# Patient Record
Sex: MALE | Race: WHITE | NOT HISPANIC OR LATINO
[De-identification: names, ages, dates, MRNs, and addresses within clinical notes are randomized per-mention and may not be internally consistent; named-entity substitution may affect disease eponyms.]

---

## 2017-12-28 ENCOUNTER — APPOINTMENT (OUTPATIENT)
Dept: CARDIOLOGY | Facility: CLINIC | Age: 57
End: 2017-12-28
Payer: COMMERCIAL

## 2017-12-28 PROCEDURE — 93000 ELECTROCARDIOGRAM COMPLETE: CPT

## 2017-12-28 PROCEDURE — 99214 OFFICE O/P EST MOD 30 MIN: CPT

## 2018-05-14 ENCOUNTER — RECORD ABSTRACTING (OUTPATIENT)
Age: 58
End: 2018-05-14

## 2018-05-14 DIAGNOSIS — Z78.9 OTHER SPECIFIED HEALTH STATUS: ICD-10-CM

## 2018-05-14 RX ORDER — ESZOPICLONE 3 MG/1
3 TABLET, COATED ORAL
Refills: 0 | Status: ACTIVE | COMMUNITY

## 2018-05-14 RX ORDER — CHOLECALCIFEROL (VITAMIN D3) 25 MCG
TABLET ORAL
Refills: 0 | Status: ACTIVE | COMMUNITY

## 2018-05-17 ENCOUNTER — APPOINTMENT (OUTPATIENT)
Dept: CARDIOLOGY | Facility: CLINIC | Age: 58
End: 2018-05-17
Payer: COMMERCIAL

## 2018-05-17 VITALS
HEIGHT: 68 IN | BODY MASS INDEX: 25.76 KG/M2 | SYSTOLIC BLOOD PRESSURE: 138 MMHG | WEIGHT: 170 LBS | RESPIRATION RATE: 18 BRPM | HEART RATE: 61 BPM | DIASTOLIC BLOOD PRESSURE: 80 MMHG

## 2018-05-17 DIAGNOSIS — N28.1 CYST OF KIDNEY, ACQUIRED: ICD-10-CM

## 2018-05-17 DIAGNOSIS — M41.80 OTHER FORMS OF SCOLIOSIS, SITE UNSPECIFIED: ICD-10-CM

## 2018-05-17 DIAGNOSIS — Z87.898 PERSONAL HISTORY OF OTHER SPECIFIED CONDITIONS: ICD-10-CM

## 2018-05-17 DIAGNOSIS — Z86.39 PERSONAL HISTORY OF OTHER ENDOCRINE, NUTRITIONAL AND METABOLIC DISEASE: ICD-10-CM

## 2018-05-17 PROCEDURE — 99214 OFFICE O/P EST MOD 30 MIN: CPT

## 2018-05-17 PROCEDURE — 93000 ELECTROCARDIOGRAM COMPLETE: CPT

## 2018-08-10 ENCOUNTER — APPOINTMENT (OUTPATIENT)
Dept: CARDIOLOGY | Facility: CLINIC | Age: 58
End: 2018-08-10
Payer: COMMERCIAL

## 2018-08-10 ENCOUNTER — MEDICATION RENEWAL (OUTPATIENT)
Age: 58
End: 2018-08-10

## 2018-08-10 PROCEDURE — 93015 CV STRESS TEST SUPVJ I&R: CPT

## 2018-08-10 PROCEDURE — A9500: CPT

## 2018-08-10 PROCEDURE — 78452 HT MUSCLE IMAGE SPECT MULT: CPT

## 2018-08-10 RX ORDER — KIT FOR THE PREPARATION OF TECHNETIUM TC99M SESTAMIBI 1 MG/5ML
INJECTION, POWDER, LYOPHILIZED, FOR SOLUTION PARENTERAL
Refills: 0 | Status: COMPLETED | OUTPATIENT
Start: 2018-08-10

## 2018-08-10 RX ADMIN — KIT FOR THE PREPARATION OF TECHNETIUM TC99M SESTAMIBI 0: 1 INJECTION, POWDER, LYOPHILIZED, FOR SOLUTION PARENTERAL at 00:00

## 2018-09-27 ENCOUNTER — APPOINTMENT (OUTPATIENT)
Dept: CARDIOLOGY | Facility: CLINIC | Age: 58
End: 2018-09-27

## 2018-10-26 ENCOUNTER — APPOINTMENT (OUTPATIENT)
Dept: CARDIOLOGY | Facility: CLINIC | Age: 58
End: 2018-10-26
Payer: COMMERCIAL

## 2018-10-26 VITALS
RESPIRATION RATE: 15 BRPM | SYSTOLIC BLOOD PRESSURE: 117 MMHG | BODY MASS INDEX: 26.83 KG/M2 | HEART RATE: 44 BPM | DIASTOLIC BLOOD PRESSURE: 80 MMHG | WEIGHT: 177 LBS | HEIGHT: 68 IN

## 2018-10-26 PROCEDURE — 93000 ELECTROCARDIOGRAM COMPLETE: CPT

## 2018-10-26 PROCEDURE — 99214 OFFICE O/P EST MOD 30 MIN: CPT

## 2018-12-27 ENCOUNTER — OUTPATIENT (OUTPATIENT)
Dept: OUTPATIENT SERVICES | Facility: HOSPITAL | Age: 58
LOS: 1 days | End: 2018-12-27
Payer: COMMERCIAL

## 2018-12-27 DIAGNOSIS — G47.30 SLEEP APNEA, UNSPECIFIED: ICD-10-CM

## 2018-12-27 PROCEDURE — 95810 POLYSOM 6/> YRS 4/> PARAM: CPT | Mod: 26

## 2018-12-27 PROCEDURE — 95810 POLYSOM 6/> YRS 4/> PARAM: CPT

## 2018-12-31 ENCOUNTER — RX RENEWAL (OUTPATIENT)
Age: 58
End: 2018-12-31

## 2019-01-21 ENCOUNTER — TRANSCRIPTION ENCOUNTER (OUTPATIENT)
Age: 59
End: 2019-01-21

## 2019-01-28 ENCOUNTER — NON-APPOINTMENT (OUTPATIENT)
Age: 59
End: 2019-01-28

## 2019-01-28 ENCOUNTER — APPOINTMENT (OUTPATIENT)
Dept: CARDIOLOGY | Facility: CLINIC | Age: 59
End: 2019-01-28
Payer: COMMERCIAL

## 2019-01-28 VITALS
SYSTOLIC BLOOD PRESSURE: 110 MMHG | HEIGHT: 68 IN | WEIGHT: 175 LBS | RESPIRATION RATE: 16 BRPM | DIASTOLIC BLOOD PRESSURE: 65 MMHG | BODY MASS INDEX: 26.52 KG/M2

## 2019-01-28 PROCEDURE — 99214 OFFICE O/P EST MOD 30 MIN: CPT

## 2019-01-28 PROCEDURE — 93000 ELECTROCARDIOGRAM COMPLETE: CPT

## 2019-01-28 RX ORDER — ATORVASTATIN CALCIUM 20 MG/1
20 TABLET, FILM COATED ORAL DAILY
Qty: 90 | Refills: 3 | Status: DISCONTINUED | COMMUNITY
Start: 2018-05-17 | End: 2019-01-28

## 2019-01-28 NOTE — HISTORY OF PRESENT ILLNESS
[FreeTextEntry1] : \par The patient's history is primarily that of:\par 1.	Hypertension.\par 2.	Hyperlipidemia.\par 3.	A dilated ascending thoracic aorta. 4 cm\par 4.             Asymptomatic coronary atherosclerosis by EBCT\par \par He's in middle of sleep study testing.\par There are no new intercurrent medical issues.  He is sometimes working double shifts.\par Patient also has concerns about management of his hyperlipidemia

## 2019-01-28 NOTE — ASSESSMENT
[FreeTextEntry1] : ECG  sinus anderson at 57. Borderline LVH. No significant ST or T wave changes.\par \par \par \par Exercise stress test 8/10/18:\par 11 minutes of Shivam protocol (12 Mets).\par Heart rate 149\par Blood pressure response normal\par ECG in assisted images negative for ischemia.\par \par Sleep study 2/27/18:\par AHI equals 16\par Lowest O2 sat 86%\par \par Laboratory data \par                     12/28/18:   9/15/18\par Cholesterol    146             168\par HDL                 40                40\par LDL                  88              111\par AST                 53                31\par ALT                  65                40\par \par EBCT 3/34/18 \par 3 circumflex lesions \par four RCA lesions 3 LAD lesions total calcium score 250 increase from 100, 6 years ago\par \par Impression:\par \par 1. Hypertension seems to be adequately controlled with the new medical regimen.\par \par 2. Atherosclerotic disease of the coronary seems to be progressing based on the above data.\par    But exercise sestamibi stress test shows excellent tolerance and no ischemia\par \par 3. Atorvastatin 20 mg somewhat more effective ( see above). Bump in transaminases is noted\par    Prior use of Crestor reportedly had caused some LFT abnormalities. Do not have the data or documentation of this.\par \par Recommendations include:\par \par 1. Cont atorvastatin 20 mg a day  recheck labs in 2 months\par  \par 2. Improvement in dietary habits.\par \par 3. Complete CPAP trial and f/u with Pulmonary\par \par 4. Office followup here in 4 months.

## 2019-01-28 NOTE — REASON FOR VISIT
[FreeTextEntry1] : Patient is seen here today in followup:\par \par calcium scoring done through [facility] examination on March 30, 2018. was increased  compared to prior test done August 7, 2012.\par Based on that and his lipid values we had increased his atorvastatin to 20 mg day never used

## 2019-02-11 ENCOUNTER — OUTPATIENT (OUTPATIENT)
Dept: OUTPATIENT SERVICES | Facility: HOSPITAL | Age: 59
LOS: 1 days | End: 2019-02-11
Payer: COMMERCIAL

## 2019-02-11 DIAGNOSIS — G47.33 OBSTRUCTIVE SLEEP APNEA (ADULT) (PEDIATRIC): ICD-10-CM

## 2019-02-11 PROCEDURE — 95811 POLYSOM 6/>YRS CPAP 4/> PARM: CPT | Mod: 26

## 2019-02-11 PROCEDURE — 95811 POLYSOM 6/>YRS CPAP 4/> PARM: CPT

## 2019-02-18 ENCOUNTER — APPOINTMENT (OUTPATIENT)
Dept: CARDIOLOGY | Facility: CLINIC | Age: 59
End: 2019-02-18
Payer: COMMERCIAL

## 2019-02-18 PROCEDURE — 93880 EXTRACRANIAL BILAT STUDY: CPT

## 2019-02-28 ENCOUNTER — APPOINTMENT (OUTPATIENT)
Dept: PULMONOLOGY | Facility: CLINIC | Age: 59
End: 2019-02-28
Payer: COMMERCIAL

## 2019-02-28 VITALS
DIASTOLIC BLOOD PRESSURE: 80 MMHG | WEIGHT: 180 LBS | OXYGEN SATURATION: 95 % | BODY MASS INDEX: 28.93 KG/M2 | SYSTOLIC BLOOD PRESSURE: 118 MMHG | HEART RATE: 56 BPM | HEIGHT: 66 IN

## 2019-02-28 DIAGNOSIS — Z87.19 PERSONAL HISTORY OF OTHER DISEASES OF THE DIGESTIVE SYSTEM: ICD-10-CM

## 2019-02-28 DIAGNOSIS — Z87.39 PERSONAL HISTORY OF OTHER DISEASES OF THE MUSCULOSKELETAL SYSTEM AND CONNECTIVE TISSUE: ICD-10-CM

## 2019-02-28 DIAGNOSIS — Z86.39 PERSONAL HISTORY OF OTHER ENDOCRINE, NUTRITIONAL AND METABOLIC DISEASE: ICD-10-CM

## 2019-02-28 PROCEDURE — 99204 OFFICE O/P NEW MOD 45 MIN: CPT

## 2019-02-28 NOTE — CONSULT LETTER
[Dear  ___] : Dear  [unfilled], [Consult Letter:] : I had the pleasure of evaluating your patient, [unfilled]. [Consult Closing:] : Thank you very much for allowing me to participate in the care of this patient.  If you have any questions, please do not hesitate to contact me. [DrJesus  ___] : Dr. MILLER

## 2019-02-28 NOTE — REVIEW OF SYSTEMS
[Difficulty Initiating Sleep] : no difficulty falling asleep [Difficulty Maintaining Sleep] : difficulty maintaining sleep [Lower Extremity Discomfort] : no lower extremity discomfort [Unusual Sleep Behavior] : no unusual sleep behavior [Hypersomnolence] : sleeping much more than usual [Negative] : Psychiatric [FreeTextEntry3] : per HPI [FreeTextEntry8] : per HPI [FreeTextEntry9] : per HPI

## 2019-02-28 NOTE — PHYSICAL EXAM
[General Appearance - In No Acute Distress] : no acute distress [Normal Conjunctiva] : the conjunctiva exhibited no abnormalities [Normal Oropharynx] : abnormal oropharynx [Low Lying Soft Palate] : low lying soft palate [Elongated Uvula] : elongated uvula [III] : III [Neck Appearance] : the appearance of the neck was normal [Heart Rate And Rhythm] : heart rate was normal and rhythm regular [Heart Sounds] : normal S1 and S2 [] : no respiratory distress [Respiration, Rhythm And Depth] : normal respiratory rhythm and effort [Exaggerated Use Of Accessory Muscles For Inspiration] : no accessory muscle use [Auscultation Breath Sounds / Voice Sounds] : lungs were clear to auscultation bilaterally [Abnormal Walk] : normal gait [Musculoskeletal - Swelling] : no joint swelling seen [Nail Clubbing] : no clubbing of the fingernails [Cyanosis, Localized] : no localized cyanosis [No Focal Deficits] : no focal deficits [Oriented To Time, Place, And Person] : oriented to person, place, and time [Impaired Insight] : insight and judgment were intact [Affect] : the affect was normal

## 2019-02-28 NOTE — HISTORY OF PRESENT ILLNESS
[AHI: ___ per hour] : Apnea-hypopnea index:  [unfilled] per hour [Date: ___] : the most recent therapeutic polysomnogram was completed [unfilled] [Snoring] : snoring [Witnessed Apneas] : witnessed sleep apnea [Frequent Nocturnal Awakening] : frequent nocturnal awakening [Unintentional Sleep While Inactive] : unintentional sleep while inactive [Awakes Unrefreshed] : awakening unrefreshed [Obstructive Sleep Apnea] : obstructive sleep apnea [FreeTextEntry1] : CPAP was not effective in resolving SAIMA.\par \par Hx anxiety.  Insomnia. Sleep also ruined by significant back pain. Takes lunesta.

## 2019-03-01 ENCOUNTER — TRANSCRIPTION ENCOUNTER (OUTPATIENT)
Age: 59
End: 2019-03-01

## 2019-03-12 ENCOUNTER — OUTPATIENT (OUTPATIENT)
Dept: OUTPATIENT SERVICES | Facility: HOSPITAL | Age: 59
LOS: 1 days | End: 2019-03-12
Payer: COMMERCIAL

## 2019-03-12 DIAGNOSIS — G47.33 OBSTRUCTIVE SLEEP APNEA (ADULT) (PEDIATRIC): ICD-10-CM

## 2019-03-12 PROCEDURE — 95811 POLYSOM 6/>YRS CPAP 4/> PARM: CPT

## 2019-03-12 PROCEDURE — 95811 POLYSOM 6/>YRS CPAP 4/> PARM: CPT | Mod: 26

## 2019-03-13 ENCOUNTER — LABORATORY RESULT (OUTPATIENT)
Age: 59
End: 2019-03-13

## 2019-03-18 ENCOUNTER — RECORD ABSTRACTING (OUTPATIENT)
Age: 59
End: 2019-03-18

## 2019-03-22 ENCOUNTER — CLINICAL ADVICE (OUTPATIENT)
Age: 59
End: 2019-03-22

## 2019-03-28 ENCOUNTER — APPOINTMENT (OUTPATIENT)
Dept: CARDIOLOGY | Facility: CLINIC | Age: 59
End: 2019-03-28
Payer: COMMERCIAL

## 2019-03-28 ENCOUNTER — APPOINTMENT (OUTPATIENT)
Dept: PULMONOLOGY | Facility: CLINIC | Age: 59
End: 2019-03-28
Payer: COMMERCIAL

## 2019-03-28 VITALS
HEIGHT: 66 IN | WEIGHT: 175 LBS | SYSTOLIC BLOOD PRESSURE: 120 MMHG | BODY MASS INDEX: 28.12 KG/M2 | DIASTOLIC BLOOD PRESSURE: 80 MMHG | RESPIRATION RATE: 16 BRPM

## 2019-03-28 VITALS
OXYGEN SATURATION: 94 % | HEIGHT: 66 IN | HEART RATE: 54 BPM | BODY MASS INDEX: 29.09 KG/M2 | DIASTOLIC BLOOD PRESSURE: 72 MMHG | WEIGHT: 181 LBS | SYSTOLIC BLOOD PRESSURE: 112 MMHG

## 2019-03-28 PROCEDURE — 99214 OFFICE O/P EST MOD 30 MIN: CPT

## 2019-03-28 PROCEDURE — 99214 OFFICE O/P EST MOD 30 MIN: CPT | Mod: 25

## 2019-03-28 PROCEDURE — 94010 BREATHING CAPACITY TEST: CPT

## 2019-03-28 PROCEDURE — 93000 ELECTROCARDIOGRAM COMPLETE: CPT

## 2019-03-28 NOTE — ASSESSMENT
[FreeTextEntry1] : ECG  sinus anderson at 57. Borderline LVH. No significant ST or T wave changes.\par \par Laboratory data \par                     12/28/18:   9/15/18\par Cholesterol    146             168\par HDL                 40                40\par LDL                  88              111\par AST                 53                31\par ALT                  65                40\par \par Laboratory data a 3/14/19:\par Cholesterol 110\par HDL 35\par LDL 65\par ALT 46  AST . 34\par \par Compared to prior study ALT was 65 and AST was 53 so both are improved.\par \par Carotid study 2/18/19 mild plaquing but no significant stenosis seen.\par \par Exercise stress test 8/10/18:\par 11 minutes of Shivam protocol (12 Mets).\par Heart rate 149\par Blood pressure response normal\par ECG in assisted images negative for ischemia.\par \par Sleep study 2/27/18:\par AHI equals 16\par Lowest O2 sat 86%\par \par \par EBCT 3/34/18 \par 3 circumflex lesions \par four RCA lesions 3 LAD lesions total calcium score 250 increase from 100, 6 years ago\par \par Impression:\par \par 1. Hypertension seems to be adequately controlled with the new medical regimen.\par \par 2. Atherosclerotic disease of the coronary seems to be progressing based on the above data.\par    But exercise sestamibi stress test shows excellent tolerance and no ischemia\par \par 3. Atorvastatin 20 mg somewhat more effective ( see above). Bump in transaminases better\par    Prior use of Crestor reportedly had caused some LFT abnormalities. Do not have the data or documentation of this.\par \par Recommendations include:\par \par 1. Cont atorvastatin 20 mg a day  recheck labs in 2 months\par  \par 2. Improvement in dietary habits.\par \par 3. Rx of SAIMA\par \par 4. Proceed with f/u EGD\par \par 4. Office followup here in 4 months.

## 2019-03-28 NOTE — REASON FOR VISIT
[FreeTextEntry1] : Patient is seen here today in followup:\par \par calcium scoring done through [facility] examination on March 30, 2018. was increased  compared to prior test done August 7, 2012.\par Based on that and his lipid values we had increased his atorvastatin to 20 mg day\par \par Was found to have stomach ulcers in January. Treatment was started.\par Aspirin was stopped.\par \par Sleep study abnormal with an AHI of 16.\par CPAP was not effective.\par BiPAP is being initiated.\par \par A repeat upper endoscopy is planned to recheck healing of the stomach ulcers.\par Blood work recently obtained to reassess elevated LFTs.

## 2019-06-27 ENCOUNTER — APPOINTMENT (OUTPATIENT)
Dept: PULMONOLOGY | Facility: CLINIC | Age: 59
End: 2019-06-27
Payer: COMMERCIAL

## 2019-06-27 ENCOUNTER — LABORATORY RESULT (OUTPATIENT)
Age: 59
End: 2019-06-27

## 2019-06-27 VITALS
BODY MASS INDEX: 28.57 KG/M2 | OXYGEN SATURATION: 96 % | SYSTOLIC BLOOD PRESSURE: 110 MMHG | DIASTOLIC BLOOD PRESSURE: 60 MMHG | WEIGHT: 177 LBS | HEART RATE: 58 BPM

## 2019-06-27 PROCEDURE — 99214 OFFICE O/P EST MOD 30 MIN: CPT

## 2019-07-29 ENCOUNTER — APPOINTMENT (OUTPATIENT)
Dept: CARDIOLOGY | Facility: CLINIC | Age: 59
End: 2019-07-29

## 2019-10-21 ENCOUNTER — LABORATORY RESULT (OUTPATIENT)
Age: 59
End: 2019-10-21

## 2019-10-22 ENCOUNTER — RECORD ABSTRACTING (OUTPATIENT)
Age: 59
End: 2019-10-22

## 2019-10-23 ENCOUNTER — RX RENEWAL (OUTPATIENT)
Age: 59
End: 2019-10-23

## 2019-11-05 ENCOUNTER — NON-APPOINTMENT (OUTPATIENT)
Age: 59
End: 2019-11-05

## 2019-11-05 ENCOUNTER — RECORD ABSTRACTING (OUTPATIENT)
Age: 59
End: 2019-11-05

## 2019-11-05 ENCOUNTER — APPOINTMENT (OUTPATIENT)
Dept: CARDIOLOGY | Facility: CLINIC | Age: 59
End: 2019-11-05
Payer: COMMERCIAL

## 2019-11-05 VITALS
HEIGHT: 66 IN | SYSTOLIC BLOOD PRESSURE: 120 MMHG | DIASTOLIC BLOOD PRESSURE: 80 MMHG | RESPIRATION RATE: 16 BRPM | BODY MASS INDEX: 29.25 KG/M2 | HEART RATE: 57 BPM | WEIGHT: 182 LBS | OXYGEN SATURATION: 98 %

## 2019-11-05 PROCEDURE — 99214 OFFICE O/P EST MOD 30 MIN: CPT

## 2019-11-05 PROCEDURE — 93000 ELECTROCARDIOGRAM COMPLETE: CPT

## 2019-11-05 NOTE — REASON FOR VISIT
[FreeTextEntry1] : 59 year old male presenting for cardiac revaluation. \par The patient's history is primarily that of:\par 1.	Hypertension.\par 2.	Hyperlipidemia.\par 3.	A dilated ascending thoracic aorta. 4 cm\par 4.             Asymptomatic coronary atherosclerosis by EBCT\par \par \par \par Continues to follow up with Dr. Cabral/ [facility] for sleep apnea. Recently received a  new mask and tolerating. Sleep study abnormal with an AHI of 16.\par \par Aspirin was stopped due to history of stomach ulcers\par \par Does not exercise, works a lot of overtime.\par \par Blood work collected on 10/21/19 review\par \par Denies any chest pain, sob, palps or edema

## 2019-11-05 NOTE — HISTORY OF PRESENT ILLNESS
[FreeTextEntry1] : calcium scoring done through [facility] examination on March 30, 2018. was increased  compared to prior test done August 7, 2012.Based on that and his lipid values we had increased his atorvastatin to 20 mg day\par

## 2019-11-05 NOTE — ASSESSMENT
[FreeTextEntry1] : ECG  sinus anderson at 57. Low voltage for  LVH,. No significant ST or T wave changes.\par \par Laboratory data \par Lab data 10/21/19\par Chol.  116\par LDL    65\par HDL   41\par LFTS WNL\par \par Carotid study 2/18/19 mild plaquing but no significant stenosis seen.\par \par Exercise stress test 8/10/18:\par 11 minutes of Shivam protocol (12 Mets).\par Heart rate 149\par Blood pressure response normal\par ECG in assisted images negative for ischemia.\par \par Sleep study 2/27/18:\par AHI  = 16\par Lowest O2 sat 86%\par \par EBCT 3/34/18 \par 3 circumflex lesions \par four RCA lesions 3 LAD lesions total calcium score 250 increase from 100, 6 years ago\par \par Impression:\par \par 1. Hypertension controlled on current regimen. Today 120/80.\par \par 2. Atherosclerotic disease of the coronary seems to be progressing based on the above data.\par    But exercise sestamibi stress test shows excellent tolerance and no ischemia\par \par 3. Lipid panel with good control  and tolerating  Atorvastatin 20 mg . LFTS WNL\par \par 4. Up 5 lbs since June, BMI 29.\par \par 5. 2017 echo stable with normal LV function.\par \par \par Recommendations include:\par \par 1. Cont atorvastatin 20 mg a day\par  \par 2. Improvement in dietary habits, exercise has been encouraged.\par \par 3. Continue Bipap and pulm f/u.\par \par 4. Repeat echo prior to follow up.\par \par Clinical follow up in 6 months \par \par \par

## 2019-12-27 ENCOUNTER — APPOINTMENT (OUTPATIENT)
Dept: PULMONOLOGY | Facility: CLINIC | Age: 59
End: 2019-12-27

## 2020-03-23 ENCOUNTER — LABORATORY RESULT (OUTPATIENT)
Age: 60
End: 2020-03-23

## 2020-03-24 LAB
ANION GAP SERPL CALC-SCNC: 11 MMOL/L
BUN SERPL-MCNC: 13 MG/DL
CALCIUM SERPL-MCNC: 9.5 MG/DL
CHLORIDE SERPL-SCNC: 106 MMOL/L
CO2 SERPL-SCNC: 26 MMOL/L
CREAT SERPL-MCNC: 0.87 MG/DL
GLUCOSE SERPL-MCNC: 96 MG/DL
POTASSIUM SERPL-SCNC: 4.4 MMOL/L
SODIUM SERPL-SCNC: 143 MMOL/L

## 2020-04-01 LAB
CHOLEST SERPL-MCNC: 103 MG/DL
CHOLEST/HDLC SERPL: 2.8 RATIO
HDLC SERPL-MCNC: 37 MG/DL
LDLC SERPL CALC-MCNC: 57 MG/DL
TRIGL SERPL-MCNC: 49 MG/DL

## 2020-04-13 ENCOUNTER — APPOINTMENT (OUTPATIENT)
Dept: CARDIOLOGY | Facility: CLINIC | Age: 60
End: 2020-04-13

## 2020-04-20 ENCOUNTER — APPOINTMENT (OUTPATIENT)
Dept: CARDIOLOGY | Facility: CLINIC | Age: 60
End: 2020-04-20

## 2020-06-29 ENCOUNTER — APPOINTMENT (OUTPATIENT)
Dept: CARDIOLOGY | Facility: CLINIC | Age: 60
End: 2020-06-29

## 2020-08-17 ENCOUNTER — APPOINTMENT (OUTPATIENT)
Dept: CARDIOLOGY | Facility: CLINIC | Age: 60
End: 2020-08-17
Payer: COMMERCIAL

## 2020-08-17 PROCEDURE — 93306 TTE W/DOPPLER COMPLETE: CPT

## 2020-09-09 LAB
ALBUMIN SERPL ELPH-MCNC: 4.2 G/DL
ALP BLD-CCNC: 89 U/L
ALT SERPL-CCNC: 42 U/L
ANION GAP SERPL CALC-SCNC: 10 MMOL/L
AST SERPL-CCNC: 36 U/L
BILIRUB SERPL-MCNC: 1 MG/DL
BUN SERPL-MCNC: 18 MG/DL
CALCIUM SERPL-MCNC: 9.9 MG/DL
CHLORIDE SERPL-SCNC: 104 MMOL/L
CHOLEST SERPL-MCNC: 107 MG/DL
CHOLEST/HDLC SERPL: 3 RATIO
CO2 SERPL-SCNC: 26 MMOL/L
CREAT SERPL-MCNC: 0.69 MG/DL
GLUCOSE SERPL-MCNC: 126 MG/DL
HDLC SERPL-MCNC: 36 MG/DL
LDLC SERPL CALC-MCNC: 64 MG/DL
POTASSIUM SERPL-SCNC: 3.8 MMOL/L
PROT SERPL-MCNC: 6.4 G/DL
SODIUM SERPL-SCNC: 140 MMOL/L
TRIGL SERPL-MCNC: 39 MG/DL

## 2020-09-14 ENCOUNTER — APPOINTMENT (OUTPATIENT)
Dept: CARDIOLOGY | Facility: CLINIC | Age: 60
End: 2020-09-14
Payer: COMMERCIAL

## 2020-09-14 ENCOUNTER — NON-APPOINTMENT (OUTPATIENT)
Age: 60
End: 2020-09-14

## 2020-09-14 VITALS
RESPIRATION RATE: 16 BRPM | DIASTOLIC BLOOD PRESSURE: 72 MMHG | BODY MASS INDEX: 26.37 KG/M2 | HEART RATE: 51 BPM | OXYGEN SATURATION: 98 % | TEMPERATURE: 97.3 F | HEIGHT: 68 IN | WEIGHT: 174 LBS | SYSTOLIC BLOOD PRESSURE: 122 MMHG

## 2020-09-14 PROCEDURE — 93000 ELECTROCARDIOGRAM COMPLETE: CPT

## 2020-09-14 PROCEDURE — 99214 OFFICE O/P EST MOD 30 MIN: CPT

## 2020-09-14 RX ORDER — FAMOTIDINE 20 MG/1
20 TABLET, FILM COATED ORAL
Qty: 180 | Refills: 0 | Status: ACTIVE | COMMUNITY
Start: 2020-09-14

## 2020-09-14 RX ORDER — ESCITALOPRAM OXALATE 5 MG/1
5 TABLET ORAL DAILY
Refills: 0 | Status: DISCONTINUED | COMMUNITY
End: 2020-09-14

## 2020-09-14 RX ORDER — OXYCODONE AND ACETAMINOPHEN 5; 325 MG/1; MG/1
5-325 TABLET ORAL
Qty: 120 | Refills: 0 | Status: ACTIVE | COMMUNITY
Start: 2020-05-06

## 2020-09-14 RX ORDER — ESCITALOPRAM OXALATE 10 MG/1
10 TABLET ORAL
Qty: 90 | Refills: 0 | Status: ACTIVE | COMMUNITY
Start: 2020-09-09

## 2020-09-14 NOTE — ASSESSMENT
[FreeTextEntry1] : ECG  sinus anderson at 51. voltage for  LVH,. No significant ST or T wave changes.\par \par Lab Data \par        20  On atorvastatin 10 mg\par Chol: 107\par HDL:   36\par LDL:   64\par Tr\par Creat: 0.69\par \par Laboratory data \par Lab data 10/21/19\par Chol.  116\par LDL    65\par HDL   41\par LFTS WNL\par \par \par Echocardiogram 20:\par Normal LV size and function ejection fraction 60-65%\par Pulmonary pressures elevated at 40 mmHg\par Left atrial enlargement\par Ascending aorta 4 cm unchanged in comparison to a study from 2017\par Carotid study 19 mild plaquing but no significant stenosis seen.\par \par Exercise stress test 8/10/18:\par 11 minutes of Shivam protocol (12 Mets).\par Heart rate 149\par Blood pressure response normal\par ECG in assisted images negative for ischemia.\par \par Sleep study 18:\par AHI  = 16\par Lowest O2 sat 86%\par \par EBCT 3/34/18 \par 3 circumflex lesions \par four RCA lesions 3 LAD lesions total calcium score 250 increase from 100, 6 years ago\par \par Impression:\par \par 1. Hypertension controlled on current regimen. \par \par 2. Atherosclerotic disease of the coronary seems to be progressing based on the above data.\par    But exercise sestamibi stress test shows excellent tolerance and no ischemia\par \par 3. Lipid panel with good control  and tolerating  Atorvastatin 10 mg . LFTS now  WNL\par \par 4. Down 8 lbs since , BMI 29.\par \par 5. repeat  echo stable with normal LV function and no change in the dimensions of the ascending aorta. 4.0 cm\par \par 6. SAIMA which is being treated with no increase in the PAP.\par \par Recommendations include:\par \par 1. Cont atorvastatin 10 mg a day with labs in 6 months\par  \par 2. Improvement in dietary habits, exercise has been encouraged.\par \par 3. Continue Bipap and pulm f/u.\par \par 4. Repeat echo 1 year\par \par Clinical follow up in 6 months \par \par \par

## 2020-09-14 NOTE — REASON FOR VISIT
[FreeTextEntry1] : 60  year old male presenting for cardiac revaluation. \par The patient's history is primarily that of:\par 1.	Hypertension.\par 2.	Hyperlipidemia.\par 3.	A dilated ascending thoracic aorta. 4 cm\par 4.             Asymptomatic coronary atherosclerosis by EBCT\par \par \par Continues to follow up with Dr. Cabral/ [facility] for sleep apnea. Recently received a  new mask and tolerating. Sleep study abnormal with an AHI of 16.\par \par Aspirin was stopped due to history of stomach ulcers\par \par Does not exercise, works a lot of overtime.\par \par Blood work collected will be reviewed\par \par Denies any chest pain, sob, palps or edema

## 2020-09-14 NOTE — HISTORY OF PRESENT ILLNESS
[FreeTextEntry1] : calcium scoring done through [facility] examination on March 30, 2018. was increased  compared to prior test done August 7, 2012.Based on that and his lipid values we had increased his atorvastatin to 20 mg day.\par \par Subsequently, the patient developed some mild LFT abnormalities and we reduced his atorvastatin to 10 mg a day.\par

## 2020-09-14 NOTE — REVIEW OF SYSTEMS
[Negative] : Heme/Lymph [Recent Weight Gain (___ Lbs)] : no recent weight gain [Recent Weight Loss (___ Lbs)] : recent [unfilled] ~Ulb weight loss

## 2020-12-14 ENCOUNTER — RX RENEWAL (OUTPATIENT)
Age: 60
End: 2020-12-14

## 2020-12-16 ENCOUNTER — RX RENEWAL (OUTPATIENT)
Age: 60
End: 2020-12-16

## 2020-12-28 ENCOUNTER — NON-APPOINTMENT (OUTPATIENT)
Age: 60
End: 2020-12-28

## 2021-02-23 ENCOUNTER — APPOINTMENT (OUTPATIENT)
Dept: PULMONOLOGY | Facility: CLINIC | Age: 61
End: 2021-02-23
Payer: COMMERCIAL

## 2021-02-23 VITALS
TEMPERATURE: 98 F | OXYGEN SATURATION: 97 % | DIASTOLIC BLOOD PRESSURE: 59 MMHG | BODY MASS INDEX: 27.98 KG/M2 | HEART RATE: 56 BPM | WEIGHT: 184 LBS | SYSTOLIC BLOOD PRESSURE: 120 MMHG

## 2021-02-23 DIAGNOSIS — F41.9 ANXIETY DISORDER, UNSPECIFIED: ICD-10-CM

## 2021-02-23 PROCEDURE — 99214 OFFICE O/P EST MOD 30 MIN: CPT

## 2021-02-23 PROCEDURE — 99072 ADDL SUPL MATRL&STAF TM PHE: CPT

## 2021-02-23 RX ORDER — KETOCONAZOLE 20.5 MG/ML
2 SHAMPOO, SUSPENSION TOPICAL
Qty: 120 | Refills: 0 | Status: DISCONTINUED | COMMUNITY
Start: 2020-02-13 | End: 2021-02-23

## 2021-02-23 RX ORDER — RANITIDINE 150 MG/1
150 TABLET ORAL TWICE DAILY
Refills: 0 | Status: DISCONTINUED | COMMUNITY
End: 2021-02-23

## 2021-02-23 RX ORDER — LEVOTHYROXINE SODIUM 25 UG/1
25 TABLET ORAL DAILY
Refills: 0 | Status: DISCONTINUED | COMMUNITY
End: 2021-02-23

## 2021-02-23 RX ORDER — PANTOPRAZOLE 40 MG/1
40 TABLET, DELAYED RELEASE ORAL DAILY
Refills: 0 | Status: DISCONTINUED | COMMUNITY
Start: 2019-02-28 | End: 2021-02-23

## 2021-03-02 LAB
ALBUMIN SERPL ELPH-MCNC: 4.6 G/DL
ALP BLD-CCNC: 86 U/L
ALT SERPL-CCNC: 40 U/L
ANION GAP SERPL CALC-SCNC: 13 MMOL/L
AST SERPL-CCNC: 35 U/L
BILIRUB SERPL-MCNC: 0.3 MG/DL
BUN SERPL-MCNC: 16 MG/DL
CALCIUM SERPL-MCNC: 9.6 MG/DL
CHLORIDE SERPL-SCNC: 104 MMOL/L
CHOLEST SERPL-MCNC: 126 MG/DL
CO2 SERPL-SCNC: 24 MMOL/L
CREAT SERPL-MCNC: 0.92 MG/DL
GLUCOSE SERPL-MCNC: 96 MG/DL
HDLC SERPL-MCNC: 42 MG/DL
LDLC SERPL CALC-MCNC: 75 MG/DL
NONHDLC SERPL-MCNC: 85 MG/DL
POTASSIUM SERPL-SCNC: 4.4 MMOL/L
PROT SERPL-MCNC: 7.2 G/DL
SODIUM SERPL-SCNC: 141 MMOL/L
TRIGL SERPL-MCNC: 47 MG/DL

## 2021-03-15 ENCOUNTER — APPOINTMENT (OUTPATIENT)
Dept: CARDIOLOGY | Facility: CLINIC | Age: 61
End: 2021-03-15
Payer: COMMERCIAL

## 2021-03-15 VITALS
BODY MASS INDEX: 26.98 KG/M2 | WEIGHT: 178 LBS | HEART RATE: 55 BPM | HEIGHT: 68 IN | TEMPERATURE: 97.8 F | RESPIRATION RATE: 16 BRPM | SYSTOLIC BLOOD PRESSURE: 118 MMHG | DIASTOLIC BLOOD PRESSURE: 75 MMHG | OXYGEN SATURATION: 98 %

## 2021-03-15 DIAGNOSIS — I07.1 RHEUMATIC TRICUSPID INSUFFICIENCY: ICD-10-CM

## 2021-03-15 PROCEDURE — 99214 OFFICE O/P EST MOD 30 MIN: CPT

## 2021-03-15 PROCEDURE — 93000 ELECTROCARDIOGRAM COMPLETE: CPT

## 2021-03-15 PROCEDURE — 99072 ADDL SUPL MATRL&STAF TM PHE: CPT

## 2021-03-15 NOTE — ASSESSMENT
[FreeTextEntry1] : ECG  sinus anderson at 55 bpm and WNL\par \par \par Lab data 3/2/21\par Chol. 126\par HDL 42\par LDL 75\par Tri. 47\par Creat. 0.92\par AST 35\par ALT 40\par \par Lab Data \par        20  On atorvastatin 10 mg\par Chol: 107\par HDL:   36\par LDL:   64\par Tr\par Creat: 0.69\par \par \par \par \par Echocardiogram 20:\par Normal LV size and function ejection fraction 60-65%\par Pulmonary pressures elevated at 40 mmHg\par Left atrial enlargement\par Ascending aorta 4 cm unchanged in comparison to a study from 2017\par Carotid study 19 mild plaquing but no significant stenosis seen.\par \par Exercise stress test 8/10/18:\par 11 minutes of Shivam protocol (12 Mets).\par Heart rate 149\par Blood pressure response normal\par ECG in assisted images negative for ischemia.\par \par Sleep study 18:\par AHI  = 16\par Lowest O2 sat 86%\par \par EBCT 3/34/18 \par 3 circumflex lesions \par four RCA lesions 3 LAD lesions total calcium score 250 increase from 100, 6 years ago\par \par Impression:\par \par 1. Hypertension controlled on current regimen.  ECG unremarkable. \par \par 2. Atherosclerotic disease of the coronary seems to be progressing based on the above data.\par    But exercise sestamibi stress test shows excellent tolerance and no ischemia\par \par 3. Lipid panel with good control  .LFTS tolerating lower dose of Atorvastatin 10 mg , although having mild b/l leg cramping. \par \par 4. Down additional  6 lbs since 21 but up 4 lbs when compared to Sept. OV.\par \par 5. Repeat  echo stable with normal LV function and no change in the dimensions of the ascending aorta. 4.0 cm\par \par 6. SAIMA which is being treated with no increase in the PAP.\par \par Recommendations include:\par \par 1. Cont atorvastatin 10 mg a day with labs in 6 months. Suggested Co-Q 10 for cramping. \par  \par 2. Improvement in dietary habits, exercise has been encouraged.\par \par 3. Continue Bipap and pulm f/u.\par \par 4. Repeat echo in the summer of \par \par Clinical follow up in 6 months \par \par \par

## 2021-03-15 NOTE — HISTORY OF PRESENT ILLNESS
[FreeTextEntry1] : calcium scoring done through [facility] examination on March 30, 2018. was increased  compared to prior test done August 7, 2012.Based on that and his lipid values we had increased his atorvastatin to 20 mg day.\par \par Subsequently, the patient developed some mild LFT abnormalities and we reduced his atorvastatin to 10 mg a day.\par Recent BW from 3/2/21 = AST 35 ALT 40, was AST 42 and ALT  53 in March of 2020\par

## 2021-03-15 NOTE — REASON FOR VISIT
[FreeTextEntry1] : 60  year old male presenting for cardiac revaluation. \par The patient's history is primarily that of:\par 1.	Hypertension.\par 2.	Hyperlipidemia.\par 3.	A dilated ascending thoracic aorta. 4 cm\par 4.             Asymptomatic coronary atherosclerosis by EBCT\par \par \par Continues to follow up with Dr. Cabral/ [facility] for sleep apnea. Has been very compliant with his BIPAP using it approx 97% with > 4 hrs. Sleep study abnormal with an AHI of 16.\par \par Aspirin was stopped due to history of stomach ulcers\par \par Does not exercise, works a lot of overtime.\par \par Blood work collected will be reviewed\par \par Denies any chest pain, sob, palps or edema. Does mention l/e cramping for weeks, believes he hydrates adequately. \par \par

## 2021-03-15 NOTE — REVIEW OF SYSTEMS
[Recent Weight Loss (___ Lbs)] : recent [unfilled] ~Ulb weight loss [Negative] : Heme/Lymph [Recent Weight Gain (___ Lbs)] : no recent weight gain

## 2021-06-23 NOTE — ASSESSMENT
[FreeTextEntry1] : Moderate SAMIA. Did not resolve on CPAP even at highest pressure tested. Did not respond to CPAP. LÓPEZ. Lung exam wnl. Lifelong nonsmoker. Hx lung nodules. 
DISPLAY PLAN FREE TEXT

## 2021-07-30 ENCOUNTER — RX RENEWAL (OUTPATIENT)
Age: 61
End: 2021-07-30

## 2021-08-17 ENCOUNTER — APPOINTMENT (OUTPATIENT)
Dept: PULMONOLOGY | Facility: CLINIC | Age: 61
End: 2021-08-17

## 2021-09-01 LAB
ALBUMIN SERPL ELPH-MCNC: 4.4 G/DL
ALP BLD-CCNC: 73 U/L
ALT SERPL-CCNC: 27 U/L
ANION GAP SERPL CALC-SCNC: 11 MMOL/L
AST SERPL-CCNC: 31 U/L
BILIRUB SERPL-MCNC: 0.4 MG/DL
BUN SERPL-MCNC: 15 MG/DL
CALCIUM SERPL-MCNC: 9.2 MG/DL
CHLORIDE SERPL-SCNC: 106 MMOL/L
CHOLEST SERPL-MCNC: 114 MG/DL
CO2 SERPL-SCNC: 25 MMOL/L
CREAT SERPL-MCNC: 0.82 MG/DL
ESTIMATED AVERAGE GLUCOSE: 123 MG/DL
GLUCOSE SERPL-MCNC: 99 MG/DL
HBA1C MFR BLD HPLC: 5.9 %
HDLC SERPL-MCNC: 38 MG/DL
LDLC SERPL CALC-MCNC: 64 MG/DL
NONHDLC SERPL-MCNC: 76 MG/DL
POTASSIUM SERPL-SCNC: 3.9 MMOL/L
PROT SERPL-MCNC: 6.6 G/DL
SODIUM SERPL-SCNC: 142 MMOL/L
TRIGL SERPL-MCNC: 58 MG/DL

## 2021-09-09 ENCOUNTER — APPOINTMENT (OUTPATIENT)
Dept: CARDIOLOGY | Facility: CLINIC | Age: 61
End: 2021-09-09
Payer: COMMERCIAL

## 2021-09-09 ENCOUNTER — APPOINTMENT (OUTPATIENT)
Dept: CARDIOLOGY | Facility: CLINIC | Age: 61
End: 2021-09-09

## 2021-09-09 PROCEDURE — 93306 TTE W/DOPPLER COMPLETE: CPT

## 2021-09-09 PROCEDURE — 93978 VASCULAR STUDY: CPT

## 2021-09-12 ENCOUNTER — RESULT CHARGE (OUTPATIENT)
Age: 61
End: 2021-09-12

## 2021-09-13 ENCOUNTER — APPOINTMENT (OUTPATIENT)
Dept: CARDIOLOGY | Facility: CLINIC | Age: 61
End: 2021-09-13
Payer: COMMERCIAL

## 2021-09-13 VITALS
HEART RATE: 51 BPM | RESPIRATION RATE: 16 BRPM | WEIGHT: 178 LBS | HEIGHT: 68 IN | SYSTOLIC BLOOD PRESSURE: 130 MMHG | BODY MASS INDEX: 26.98 KG/M2 | OXYGEN SATURATION: 98 % | DIASTOLIC BLOOD PRESSURE: 90 MMHG

## 2021-09-13 PROCEDURE — 99214 OFFICE O/P EST MOD 30 MIN: CPT

## 2021-09-13 PROCEDURE — 93000 ELECTROCARDIOGRAM COMPLETE: CPT

## 2021-09-13 NOTE — REASON FOR VISIT
[FreeTextEntry1] : 61  year old male presenting for cardiac revaluation. \par The patient's history is primarily that of:\par 1.	Hypertension.\par 2.	Hyperlipidemia.\par 3.	A dilated ascending thoracic aorta. 4 cm\par 4.             Asymptomatic coronary atherosclerosis by EBCT\par \par \par Continues to follow up with Dr. Cabral/ [facility] for sleep apnea. Has been very compliant with his BIPAP using it approx 97% with > 4 hrs. Sleep study abnormal with an AHI of 16.\par \par Aspirin was stopped due to history of stomach ulcers\par \par Does not exercise, works a lot of overtime.\par \par Blood work collected will be reviewed\par \par Denies any chest pain, sob, palps or edema. Does mention l/e cramping for weeks, believes he hydrates adequately. \par \par

## 2021-09-13 NOTE — ASSESSMENT
[FreeTextEntry1] : ECG  sinus anderson at 51 bpm and WNL\par \par \par Lab data \par             3/2/21     9/1/21\par Chol.    126            114\par HDL       42             38\par LDL       75             64\par Tri.        47             58\par A1c                       5.9\par Creat. 0.92\par AST      35\par ALT       40\par \par Lab Data \par        20  On atorvastatin 10 mg\par Chol: 107\par HDL:   36\par LDL:   64\par Tr\par Creat: 0.69\par \par Echocardiogram 2021:\par LVEF 55 to 60%\par Mild mitral and tricuspid insufficiency\par Mild dilatation of the aorta 4.0 cm  seems unchanged in comparison to prior study.\par \par Echocardiogram 20:\par Normal LV size and function ejection fraction 60-65%\par Pulmonary pressures elevated at 40 mmHg\par Left atrial enlargement\par Ascending aorta 4 cm unchanged in comparison to a study from 2017\par Carotid study 19 mild plaquing but no significant stenosis seen.\par \par Abdominal aortic sonogram 2021:\par Plaquing is noted of the mid aorta.  No evidence of dilatation.\par \par Exercise stress test 8/10/18:\par 11 minutes of Shivam protocol (12 Mets).\par Heart rate 149\par Blood pressure response normal\par ECG in assisted images negative for ischemia.\par \par Sleep study 18:\par AHI  = 16\par Lowest O2 sat 86%\par \par EBCT 3/34/18 \par 3 circumflex lesions \par four RCA lesions 3 LAD lesions total calcium score 250 increase from 100, 6 years ago\par \par Impression:\par \par 1.  Blood pressure elevated today.  Not being measured at home.  A bit uncertain about control.\par \par 2. Atherosclerotic disease of the coronary seems to be progressing based on the above data.\par    But exercise sestamibi stress   showed excellent tolerance and no ischemia\par \par 3. Lipid panel with good control  .LFTS tolerating lower dose of Atorvastatin 10 mg \par \par 4.  Maintaining current weight.\par \par 5. Repeat  echo stable with normal LV function and no change in the dimensions of the ascending aorta. 4.0 cm\par \par 6. SAIMA which is being treated with no increase in the PAP.\par \par Recommendations include:\par \par 1. Cont atorvastatin 10 mg a day with labs in 6 months. Suggested Co-Q 10 for cramping. \par  \par 2. Improvement in dietary habits, exercise has been encouraged.\par \par 3. Continue Bipap and pulm f/u.\par \par 4. Repeat exercise sestamibi stress test on follow-up\par \par Clinical follow up in 6 months \par \par \par

## 2021-09-13 NOTE — HISTORY OF PRESENT ILLNESS
[FreeTextEntry1] : calcium scoring done through [facility] examination on March 30, 2018. was increased  compared to prior test done August 7, 2012.Based on that and his lipid values we had increased his atorvastatin to 20 mg day.\par \par Subsequently, the patient developed some mild LFT abnormalities and we reduced his atorvastatin to 10 mg a day.\par \par

## 2021-10-06 ENCOUNTER — APPOINTMENT (OUTPATIENT)
Dept: PULMONOLOGY | Facility: CLINIC | Age: 61
End: 2021-10-06
Payer: COMMERCIAL

## 2021-10-06 VITALS
BODY MASS INDEX: 27.07 KG/M2 | OXYGEN SATURATION: 97 % | WEIGHT: 178 LBS | HEART RATE: 50 BPM | DIASTOLIC BLOOD PRESSURE: 62 MMHG | SYSTOLIC BLOOD PRESSURE: 120 MMHG

## 2021-10-06 DIAGNOSIS — R06.00 DYSPNEA, UNSPECIFIED: ICD-10-CM

## 2021-10-06 PROCEDURE — 99214 OFFICE O/P EST MOD 30 MIN: CPT

## 2021-11-16 ENCOUNTER — RX RENEWAL (OUTPATIENT)
Age: 61
End: 2021-11-16

## 2022-02-28 ENCOUNTER — LABORATORY RESULT (OUTPATIENT)
Age: 62
End: 2022-02-28

## 2022-03-14 ENCOUNTER — APPOINTMENT (OUTPATIENT)
Dept: CARDIOLOGY | Facility: CLINIC | Age: 62
End: 2022-03-14
Payer: COMMERCIAL

## 2022-03-14 VITALS
WEIGHT: 178 LBS | OXYGEN SATURATION: 98 % | BODY MASS INDEX: 26.98 KG/M2 | SYSTOLIC BLOOD PRESSURE: 125 MMHG | RESPIRATION RATE: 16 BRPM | HEART RATE: 54 BPM | HEIGHT: 68 IN | DIASTOLIC BLOOD PRESSURE: 80 MMHG

## 2022-03-14 DIAGNOSIS — G89.29 DORSALGIA, UNSPECIFIED: ICD-10-CM

## 2022-03-14 DIAGNOSIS — K21.9 GASTRO-ESOPHAGEAL REFLUX DISEASE W/OUT ESOPHAGITIS: ICD-10-CM

## 2022-03-14 DIAGNOSIS — M54.9 DORSALGIA, UNSPECIFIED: ICD-10-CM

## 2022-03-14 DIAGNOSIS — G47.00 INSOMNIA, UNSPECIFIED: ICD-10-CM

## 2022-03-14 PROCEDURE — 93000 ELECTROCARDIOGRAM COMPLETE: CPT

## 2022-03-14 PROCEDURE — 99214 OFFICE O/P EST MOD 30 MIN: CPT

## 2022-03-14 RX ORDER — OMEGA-3-ACID ETHYL ESTERS 1 G/1
1 CAPSULE, LIQUID FILLED ORAL DAILY
Refills: 0 | Status: DISCONTINUED | COMMUNITY
End: 2022-03-14

## 2022-03-14 RX ORDER — PANTOPRAZOLE SODIUM 40 MG/1
40 GRANULE, DELAYED RELEASE ORAL
Refills: 0 | Status: ACTIVE | COMMUNITY

## 2022-03-14 NOTE — ASSESSMENT
[FreeTextEntry1] : ECG  sinus anderson at 54  bpm and WNL\par \par Lab data \par -------3/2/21--3/1/22\par Chol--126-----140\par HDL---42-------39\par LDL---75-------85\par Tri-----47------77\par Creat-0.92\par AST--35\par ALT--40\par \par Lab Data \par        20  On atorvastatin 10 mg\par Chol: 107\par HDL:   36\par LDL:   64\par Tr\par Creat: 0.69\par \par Echocardiogram 2021:\par LVEF 50 to 55%\par Mildly dilated ascending aorta 4 cm\par Not significantly changed from prior study\par \par Echocardiogram 20:\par Normal LV size and function ejection fraction 60-65%\par Pulmonary pressures elevated at 40 mmHg\par Left atrial enlargement\par Ascending aorta 4 cm unchanged in comparison to a study from 2017\par Carotid study 19 mild plaquing but no significant stenosis seen.\par \par Exercise stress test 8/10/18:\par 11 minutes of Shivam protocol (12 Mets).\par Heart rate 149\par Blood pressure response normal\par ECG in assisted images negative for ischemia.\par \par Sleep study 18:\par AHI  = 16\par Lowest O2 sat 86%\par \par EBCT 3/34/18 \par 3 circumflex lesions \par four RCA lesions 3 LAD lesions total calcium score 250 increase from 100, 6 years ago\par \par Impression:\par \par 1. Hypertension controlled on current regimen.  ECG unremarkable. \par \par 2. Atherosclerotic disease of the coronary seems to be progressing based on the above data.\par    But exercise sestamibi stress test shows excellent tolerance and no ischemia\par \par 3.  Hyperlipidemia.  LDL previously in the 60s now in the 80s LFTS improved with lower dose of Atorvastatin 10 \par \par 4. 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 Repeat  echo stable with normal LV function and no change in the dimensions of the ascending aorta. 4.0 cm\par \par \par Recommendations include:\par \par 1. Cont atorvastatin 10 mg a day with labs in 6 months. Suggested Co-Q 10 for cramping. \par  \par 2. Improvement in dietary habits, exercise has been encouraged.\par \par 3. Continue Bipap and pulm f/u.\par \par 4. Repeat echo  end of \par \par Clinical follow up in 6 months \par \par \par

## 2022-08-11 ENCOUNTER — LABORATORY RESULT (OUTPATIENT)
Age: 62
End: 2022-08-11

## 2022-08-22 ENCOUNTER — APPOINTMENT (OUTPATIENT)
Dept: CARDIOLOGY | Facility: CLINIC | Age: 62
End: 2022-08-22

## 2022-08-22 VITALS
RESPIRATION RATE: 16 BRPM | WEIGHT: 178 LBS | HEART RATE: 47 BPM | OXYGEN SATURATION: 97 % | HEIGHT: 68 IN | BODY MASS INDEX: 26.98 KG/M2 | SYSTOLIC BLOOD PRESSURE: 140 MMHG | DIASTOLIC BLOOD PRESSURE: 90 MMHG

## 2022-08-22 PROCEDURE — 99214 OFFICE O/P EST MOD 30 MIN: CPT | Mod: 25

## 2022-08-22 PROCEDURE — 93000 ELECTROCARDIOGRAM COMPLETE: CPT

## 2022-08-22 RX ORDER — ATORVASTATIN CALCIUM 10 MG/1
10 TABLET, FILM COATED ORAL
Qty: 90 | Refills: 3 | Status: DISCONTINUED | COMMUNITY
Start: 2020-12-16 | End: 2022-08-22

## 2022-08-22 RX ORDER — MV-MIN/FOLIC/VIT K/LYCOP/COQ10 200-100MCG
CAPSULE ORAL
Refills: 0 | Status: DISCONTINUED | COMMUNITY
End: 2022-08-22

## 2022-08-22 RX ORDER — CHLORHEXIDINE GLUCONATE, 0.12% ORAL RINSE 1.2 MG/ML
0.12 SOLUTION DENTAL
Qty: 473 | Refills: 0 | Status: ACTIVE | COMMUNITY
Start: 2022-08-17

## 2022-08-22 NOTE — ASSESSMENT
[FreeTextEntry1] : ECG  sinus anderson at 47 bpm and WNL\par \par Lab data \par -------3/2/21--3/1/22--8/15/22\par Chol--126-----140-----101\par HDL---42-------39-------31\par LDL---75-------85-------56\par Tri-----47------77-------71\par Creat-0.92\par AST--35\par ALT--40\par \par Lab Data \par        20  On atorvastatin 10 mg\par Chol: 107\par HDL:   36\par LDL:   64\par Tr\par Creat: 0.69\par \par Echocardiogram 2021:\par LVEF 50 to 55%\par Mildly dilated ascending aorta 4 cm\par Not significantly changed from prior study\par \par Echocardiogram 20:\par Normal LV size and function ejection fraction 60-65%\par Pulmonary pressures elevated at 40 mmHg\par Left atrial enlargement\par Ascending aorta 4 cm unchanged in comparison to a study from 2017\par Carotid study 19 mild plaquing but no significant stenosis seen.\par \par Exercise stress test 8/10/18:\par 11 minutes of Shivam protocol (12 Mets).\par Heart rate 149\par Blood pressure response normal\par ECG in assisted images negative for ischemia.\par \par Sleep study 18:\par AHI  = 16\par Lowest O2 sat 86%\par \par CT calcium score 2022:\par Total-540\par Left main-54\par LAD-279\par Circumflex-126\par RCA-281\par Ascending aorta 4 cm\par \par EBCT 3/34/18 \par 3 circumflex lesions \par four RCA lesions 3 LAD lesions total calcium score 250 increase from 100, 6 years ago\par \par Impression:\par \par 1. Hypertension generally controlled on current regimen, though a bit elevated today.  ECG unremarkable. \par \par 2. Atherosclerotic disease of the coronary seems to be progressing based on the above data.\par   \par 3.  Hyperlipidemia.  LDL substantially improved with more aggressive dietary measures in the last 2 months.\par \par 4. 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 Repeat  echo stable with normal LV function and no change in the dimensions of the ascending aorta. 4.0 cm\par \par \par Recommendations include:\par \par 1.  Change atorvastatin to simvastatin 20 mg p.o. daily. Suggested Co-Q 10 for cramping. \par  \par 2.  Continue with the improvement in dietary habits, exercise has been encouraged.\par \par 3. Continue Bipap and pulm f/u.\par \par 4. Repeat echo  end of \par \par 5 repeat lipid panel in 3 months with goal LDL remaining less than 70\par \par 6.  Exercise sestamibi stress test recommended in view of the rapidly accelerating calcium scoring and the associated risk with it.\par \par Clinical follow up in 6 months \par \par \par

## 2022-08-22 NOTE — HISTORY OF PRESENT ILLNESS
[FreeTextEntry1] : calcium scoring done through [facility] examination on 6/23/2022 was increased  compared to prior test done 2018.  \par \par Based on that and his lipid values we had increased his atorvastatin to 20 mg day in 2018.\par \par Subsequently, the patient developed some mild LFT abnormalities and we reduced his atorvastatin to 10 mg a day.\par \par

## 2022-08-22 NOTE — REASON FOR VISIT
[FreeTextEntry1] : 62   year old male presenting for cardiac revaluation. \par The patient's history is primarily that of:\par 1.	Hypertension.\par 2.	Hyperlipidemia.\par 3.	A dilated ascending thoracic aorta. 4 cm\par 4.             coronary atherosclerosis by EBCT\par \par Continues to follow up with Dr. Cabral/ Sundeep for sleep apnea. Has been very compliant with his BIPAP using it approx 97% with > 4 hrs. Sleep study abnormal with an AHI of 16.\par \par Aspirin was stopped due to history of stomach ulcers\par \par Does not exercise, works a lot of overtime.\par \par Blood work collected will be reviewed\par \par Denies any chest pain, sob, palps or edema. Does mention l/e cramping for weeks, believes he hydrates adequately. \par \par Concerned about a recent body CT that gave him a calcium score of 540 increased significantly from that of 2018.

## 2022-10-24 ENCOUNTER — MED ADMIN CHARGE (OUTPATIENT)
Age: 62
End: 2022-10-24

## 2022-10-24 ENCOUNTER — APPOINTMENT (OUTPATIENT)
Dept: PULMONOLOGY | Facility: CLINIC | Age: 62
End: 2022-10-24

## 2022-10-24 ENCOUNTER — APPOINTMENT (OUTPATIENT)
Dept: CARDIOLOGY | Facility: CLINIC | Age: 62
End: 2022-10-24

## 2022-10-24 VITALS
SYSTOLIC BLOOD PRESSURE: 140 MMHG | HEART RATE: 67 BPM | DIASTOLIC BLOOD PRESSURE: 70 MMHG | OXYGEN SATURATION: 96 % | RESPIRATION RATE: 16 BRPM

## 2022-10-24 VITALS — WEIGHT: 167 LBS | BODY MASS INDEX: 25.39 KG/M2

## 2022-10-24 PROCEDURE — 93015 CV STRESS TEST SUPVJ I&R: CPT

## 2022-10-24 PROCEDURE — 99214 OFFICE O/P EST MOD 30 MIN: CPT

## 2022-10-24 PROCEDURE — 78452 HT MUSCLE IMAGE SPECT MULT: CPT

## 2022-10-24 PROCEDURE — A9500: CPT

## 2022-10-24 RX ORDER — REGADENOSON 0.08 MG/ML
0.4 INJECTION, SOLUTION INTRAVENOUS
Qty: 2 | Refills: 0 | Status: COMPLETED | OUTPATIENT
Start: 2022-10-24

## 2022-10-24 RX ADMIN — REGADENOSON 0 MG/5ML: 0.08 INJECTION, SOLUTION INTRAVENOUS at 00:00

## 2022-11-07 ENCOUNTER — APPOINTMENT (OUTPATIENT)
Dept: CARDIOLOGY | Facility: CLINIC | Age: 62
End: 2022-11-07

## 2022-11-07 VITALS
BODY MASS INDEX: 26.22 KG/M2 | WEIGHT: 173 LBS | RESPIRATION RATE: 16 BRPM | HEART RATE: 50 BPM | HEIGHT: 68 IN | OXYGEN SATURATION: 97 % | DIASTOLIC BLOOD PRESSURE: 70 MMHG | SYSTOLIC BLOOD PRESSURE: 110 MMHG

## 2022-11-07 DIAGNOSIS — Z00.00 ENCOUNTER FOR GENERAL ADULT MEDICAL EXAMINATION W/OUT ABNORMAL FINDINGS: ICD-10-CM

## 2022-11-07 PROCEDURE — 93000 ELECTROCARDIOGRAM COMPLETE: CPT

## 2022-11-07 PROCEDURE — 99214 OFFICE O/P EST MOD 30 MIN: CPT | Mod: 25

## 2022-11-07 RX ORDER — UBIDECARENONE 100 MG
100 CAPSULE ORAL
Qty: 90 | Refills: 0 | Status: ACTIVE | COMMUNITY

## 2022-11-07 RX ORDER — CLINDAMYCIN HYDROCHLORIDE 150 MG/1
150 CAPSULE ORAL
Qty: 28 | Refills: 0 | Status: DISCONTINUED | COMMUNITY
Start: 2022-08-17 | End: 2022-11-07

## 2022-11-13 NOTE — ASSESSMENT
[FreeTextEntry1] : \par \par Lab data \par -------3/2/21--3/1/22--8/15/22--10/6/22\par Chol--126-----140-----101--------129\par HDL---42-------39-------31---------42\par LDL---75-------85-------56----------75\par Tri-----47------77-------71-----------57\par Creat-0.92\par AST--35\par ALT--40\par E8T------------------------------------5.9\par \par Lab Data \par        20  On atorvastatin 10 mg\par Chol: 107\par HDL:   36\par LDL:   64\par Tr\par Creat: 0.69\par \par Echocardiogram 2021:\par LVEF 50 to 55%\par Mildly dilated ascending aorta 4 cm\par Not significantly changed from prior study\par \par Echocardiogram 20:\par Normal LV size and function ejection fraction 60-65%\par Pulmonary pressures elevated at 40 mmHg\par Left atrial enlargement\par Ascending aorta 4 cm unchanged in comparison to a study from 2017\par Carotid study 19 mild plaquing but no significant stenosis seen.\par \par Pharmacologic nuclear stress 10/20/2022:\par Exercise: Time: 6 minutes 25 seconds 7 METS\par Heart rate 100 bpm (63%-Submax)\par Converted to Lexiscan\par SPECT imaging: Artifact but no significant ischemia.\par \par Exercise stress test 8/10/18:\par 11 minutes of Shivam protocol (12 Mets).\par Heart rate 149\par Blood pressure response normal\par ECG in assisted images negative for ischemia.\par \par Sleep study 18:\par AHI  = 16\par Lowest O2 sat 86%\par \par CT calcium score 2022:\par Total-540\par Left main-54\par LAD-279\par Circumflex-126\par RCA-281\par Ascending aorta 4 cm\par \par EBCT 3/34/18 \par 3 circumflex lesions \par four RCA lesions 3 LAD lesions total calcium score 250 increase from 100, 6 years ago\par \par Impression:\par \par 1. Hypertension generally controlled on current regimen. .  ECG 22 unremarkable. \par \par 2.  Concerns about increasing calcium score and possible relationship to increase atherosclerotic burden.\par      No evidence of ischemia on prior stress testing\par \par \par 3.  Hyperlipidemia.  Increase in LDL noted 75 versus 56, occurring with mistakenly using atorvastatin 10 and simvastatin 40 mg (?).\par \par 4. 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   echo stable with normal LV function and no change in the dimensions of the ascending aorta. 4.0 cm\par \par \par Recommendations include:\par \par 1.  There is no apparent cardiac contraindication to proceeding with the proposed carpal tunnel release surgery.\par      Amlodipine and nebivolol may be taken with sips of water as per regular schedule preoperatively. \par \par 2.  Patient should  continue simvastatin 40 mg p.o. daily.\par      Continue with the improvement in dietary habits, exercise has been encouraged.\par \par 3. Continue Bipap and pulm f/u.\par \par 4. Repeat echo with follow-up\par \par 5 repeat lipid panel in 3 months with goal LDL remaining less than 70 if failure to reach this target we will consider using a PSK 9 inhibitor\par \par 6.  Patient encouraged to contact me with regard to any new and/or progressive exertional chest discomfort.\par \par Clinical follow-up in 4 months.\par \par \par

## 2022-11-13 NOTE — HISTORY OF PRESENT ILLNESS
[FreeTextEntry1] : calcium scoring done through [facility] examination on 6/23/2022 was increased  compared to prior test done 2018.  \par \par Based on that and his lipid values we had increased his atorvastatin to 20 mg day in 2018.\par \par Subsequently, the patient developed some mild LFT abnormalities and we reduced his atorvastatin to 10 mg a day.\par \par Changed patient over to simvastatin 40 mg a day because of a low HDL on atorvastatin.\par Retrospectively, patient believes he may be taking simvastatin and atorvastatin together.\par \par

## 2022-11-13 NOTE — REASON FOR VISIT
[FreeTextEntry1] : 62   year old male presenting for cardiac revaluation.  Anticipates right carpal tunnel release surgery shortly.  While\par . \par The patient's history is primarily that of:\par 1.	Hypertension.\par 2.	Hyperlipidemia.\par 3.	A dilated ascending thoracic aorta. 4 cm\par 4.             coronary atherosclerosis by increased calcium scoring\par \par Continues to follow up with Dr. Cabral/ Sundeep for sleep apnea. Has been very compliant with his BIPAP using it approx 97% with > 4 hrs. Sleep study abnormal with an AHI of 16.\par \par Aspirin was stopped due to history of stomach ulcers\par \par Inconsistent with exercise: Works a lot of overtime.\par \par Blood work collected will be reviewed\par \par Denies any exertional chest pain, sob, palps or edema.  Occasional poorly defined nonexertional short-lived chest pressure.  \par Does mention l/e cramping for weeks, believes he hydrates adequately. \par \par Calcium scoring CT -  540 which was increased from that of 2018.

## 2023-02-13 ENCOUNTER — APPOINTMENT (OUTPATIENT)
Dept: CARDIOLOGY | Facility: CLINIC | Age: 63
End: 2023-02-13
Payer: COMMERCIAL

## 2023-02-13 PROCEDURE — 93306 TTE W/DOPPLER COMPLETE: CPT

## 2023-02-15 RX ORDER — KIT FOR THE PREPARATION OF TECHNETIUM TC99M SESTAMIBI 1 MG/5ML
INJECTION, POWDER, LYOPHILIZED, FOR SOLUTION PARENTERAL
Refills: 0 | Status: COMPLETED | OUTPATIENT
Start: 2023-02-15

## 2023-02-15 RX ADMIN — KIT FOR THE PREPARATION OF TECHNETIUM TC99M SESTAMIBI 0: 1 INJECTION, POWDER, LYOPHILIZED, FOR SOLUTION PARENTERAL at 00:00

## 2023-03-10 LAB
ALBUMIN SERPL ELPH-MCNC: 4.4 G/DL
ALP BLD-CCNC: 54 U/L
ALT SERPL-CCNC: 39 U/L
ANION GAP SERPL CALC-SCNC: 10 MMOL/L
AST SERPL-CCNC: 32 U/L
BILIRUB SERPL-MCNC: 0.5 MG/DL
BUN SERPL-MCNC: 17 MG/DL
CALCIUM SERPL-MCNC: 9.7 MG/DL
CHLORIDE SERPL-SCNC: 104 MMOL/L
CHOLEST SERPL-MCNC: 145 MG/DL
CO2 SERPL-SCNC: 28 MMOL/L
CREAT SERPL-MCNC: 0.87 MG/DL
EGFR: 98 ML/MIN/1.73M2
GLUCOSE SERPL-MCNC: 86 MG/DL
HDLC SERPL-MCNC: 40 MG/DL
LDLC SERPL CALC-MCNC: 92 MG/DL
NONHDLC SERPL-MCNC: 105 MG/DL
POTASSIUM SERPL-SCNC: 4.2 MMOL/L
PROT SERPL-MCNC: 6.6 G/DL
SODIUM SERPL-SCNC: 142 MMOL/L
TRIGL SERPL-MCNC: 65 MG/DL

## 2023-03-19 ENCOUNTER — NON-APPOINTMENT (OUTPATIENT)
Age: 63
End: 2023-03-19

## 2023-03-20 ENCOUNTER — APPOINTMENT (OUTPATIENT)
Dept: CARDIOLOGY | Facility: CLINIC | Age: 63
End: 2023-03-20
Payer: COMMERCIAL

## 2023-03-20 VITALS
SYSTOLIC BLOOD PRESSURE: 152 MMHG | WEIGHT: 175 LBS | RESPIRATION RATE: 16 BRPM | OXYGEN SATURATION: 96 % | HEART RATE: 49 BPM | BODY MASS INDEX: 26.52 KG/M2 | DIASTOLIC BLOOD PRESSURE: 80 MMHG | HEIGHT: 68 IN

## 2023-03-20 PROCEDURE — 99214 OFFICE O/P EST MOD 30 MIN: CPT | Mod: 25

## 2023-03-20 PROCEDURE — 93000 ELECTROCARDIOGRAM COMPLETE: CPT

## 2023-03-20 RX ORDER — SIMVASTATIN 40 MG/1
40 TABLET, FILM COATED ORAL
Qty: 90 | Refills: 3 | Status: DISCONTINUED | COMMUNITY
Start: 2022-08-22 | End: 2023-03-20

## 2023-03-20 NOTE — ASSESSMENT
[FreeTextEntry1] : Sinus bradycardia at 49 bpm.  No significant ST-T wave changes.\par \par Lab data ------------------------------------Simva 40 \par -------3/2/21--3/1/22--8/15/22--10/6/22--3/4/23\par Chol--126-----140-----101--------129-----145\par HDL---42-------39-------31---------42------40\par LDL---75-------85-------56----------75-----92\par Tri-----47------77-------71-----------57----65\par Creat-0.92\par AST--35\par ALT--40\par P0G------------------------------------5.9\par \par Lab Data \par        20  On atorvastatin 10 mg\par Chol: 107\par HDL:   36\par LDL:   64\par Tr\par Creat: 0.69\par \par Echocardiogram 2023:\par Normal LV size and function EF 60%\par Left atrial enlargement\par Mild mitral tricuspid sufficiency\par Aorta 4.0 cm\par PA systolic 38 mmHg.-Essentially unchanged\par \par Echocardiogram 2021:\par LVEF 50 to 55%\par Mildly dilated ascending aorta 4 cm\par Not significantly changed from prior study\par \par Echocardiogram 20:\par Normal LV size and function ejection fraction 60-65%\par Pulmonary pressures elevated at 40 mmHg\par Left atrial enlargement\par Ascending aorta 4 cm unchanged in comparison to a study from 2017\par Carotid study 19 mild plaquing but no significant stenosis seen.\par \par Pharmacologic nuclear stress 10/20/2022:\par Exercise: Time: 6 minutes 25 seconds 7 METS\par Heart rate 100 bpm (63%-Submax)\par Converted to Lexiscan\par SPECT imaging: Artifact but no significant ischemia.\par \par Exercise stress test 8/10/18:\par 11 minutes of Shivam protocol (12 Mets).\par Heart rate 149\par Blood pressure response normal\par ECG in assisted images negative for ischemia.\par \par Sleep study 18:\par AHI  = 16\par Lowest O2 sat 86%\par \par CT calcium score 2022:\par Total-540\par Left main-54\par LAD-279\par Circumflex-126\par RCA-281\par Ascending aorta 4 cm\par \par EBCT 3/34/18 \par 3 circumflex lesions \par four RCA lesions 3 LAD lesions total calcium score 250 increase from 100, 6 years ago\par \par Impression:\par \par 1. Hypertension: Has been previously well controlled, today elevated.  No home readings.\par \par 2.  Concerns about increasing calcium score and possible relationship to increase atherosclerotic burden.\par      No evidence of ischemia on prior stress testing\par \par 3.  Hyperlipidemia.  On simvastatin and Zetia LDL still well above target at 92.\par \par 4. 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   echo stable with normal LV function and no change in the dimensions of the ascending aorta. 4.0 cm\par \par 6.  Stable ascending aorta dimensions 4.0 cm.\par \par Recommendations include:\par \par 1.  Monitor blood pressure x2 weeks and contact me with average.\par \par 2. Rx Rosuvastatin 40 mg with Zetia and repeat labs in 3 months\par      Continue with the improvement in dietary habits, exercise has been encouraged.\par \par 3. Continue Bipap and pulm f/u.\par \par 4. Repeat echo with follow-up\par \par 5 repeat lipid panel in 3 months with goal LDL remaining less than 70 if failure to reach this target we will consider using a PSK 9 inhibitor\par \par 6.  Patient encouraged to contact me with regard to any new and/or progressive exertional chest discomfort.\par \par Clinical follow-up in 4 months.\par \par \par

## 2023-03-20 NOTE — REASON FOR VISIT
[FreeTextEntry1] : 62   year old male presenting for cardiac revaluation. \par . \par The patient's history is primarily that of:\par 1.	Hypertension.\par 2.	Hyperlipidemia.\par 3.	A dilated ascending thoracic aorta. 4 cm\par 4.             coronary atherosclerosis by increased calcium scoring\par \par Continues to follow up with Dr. Cabral/ Sundeep for sleep apnea. Has been very compliant with his BIPAP using it approx 97% with > 4 hrs. Sleep study abnormal with an AHI of 16.\par \par Aspirin was stopped due to history of stomach ulcers\par \par Inconsistent with exercise: Works a lot of overtime.\par \par Blood work collected will be reviewed\par \par Denies any exertional chest pain, sob, palps or edema.  Occasional poorly defined nonexertional short-lived chest pressure.  \par Does mention l/e cramping for weeks, believes he hydrates adequately. \par \par Calcium scoring CT -  540 which was increased from that of 2018.

## 2023-03-20 NOTE — HISTORY OF PRESENT ILLNESS
[FreeTextEntry1] : calcium scoring done through [facility] examination on 6/23/2022 was increased  compared to prior test done 2018.  \par \par Based on that and his lipid values we had increased his atorvastatin to 20 mg day in 2018.\par \par Subsequently, the patient developed some mild LFT abnormalities and we reduced his atorvastatin to 10 mg a day.\par \par Changed patient over to simvastatin 40 mg a day because of a low HDL on atorvastatin.\par \par \par Patient here to review the results of exercise sestamibi testing and echocardiography.\par \par

## 2023-04-19 ENCOUNTER — NON-APPOINTMENT (OUTPATIENT)
Age: 63
End: 2023-04-19

## 2023-07-13 LAB
ALBUMIN SERPL ELPH-MCNC: 4.5 G/DL
ALP BLD-CCNC: 62 U/L
ALT SERPL-CCNC: 44 U/L
ANION GAP SERPL CALC-SCNC: 12 MMOL/L
AST SERPL-CCNC: 49 U/L
BILIRUB SERPL-MCNC: 0.5 MG/DL
BUN SERPL-MCNC: 14 MG/DL
CALCIUM SERPL-MCNC: 9.6 MG/DL
CHLORIDE SERPL-SCNC: 104 MMOL/L
CHOLEST SERPL-MCNC: 111 MG/DL
CO2 SERPL-SCNC: 26 MMOL/L
CREAT SERPL-MCNC: 0.81 MG/DL
EGFR: 99 ML/MIN/1.73M2
GLUCOSE SERPL-MCNC: 91 MG/DL
HDLC SERPL-MCNC: 39 MG/DL
LDLC SERPL CALC-MCNC: 59 MG/DL
NONHDLC SERPL-MCNC: 72 MG/DL
POTASSIUM SERPL-SCNC: 3.9 MMOL/L
PROT SERPL-MCNC: 7 G/DL
SODIUM SERPL-SCNC: 143 MMOL/L
TRIGL SERPL-MCNC: 60 MG/DL

## 2023-07-21 ENCOUNTER — APPOINTMENT (OUTPATIENT)
Dept: CARDIOLOGY | Facility: CLINIC | Age: 63
End: 2023-07-21
Payer: COMMERCIAL

## 2023-07-21 VITALS
DIASTOLIC BLOOD PRESSURE: 80 MMHG | HEART RATE: 49 BPM | WEIGHT: 165 LBS | RESPIRATION RATE: 16 BRPM | BODY MASS INDEX: 25.01 KG/M2 | SYSTOLIC BLOOD PRESSURE: 132 MMHG | HEIGHT: 68 IN | OXYGEN SATURATION: 97 %

## 2023-07-21 PROCEDURE — 93000 ELECTROCARDIOGRAM COMPLETE: CPT

## 2023-07-21 PROCEDURE — 99214 OFFICE O/P EST MOD 30 MIN: CPT | Mod: 25

## 2023-07-21 RX ORDER — PREGABALIN 100 MG/1
100 CAPSULE ORAL 3 TIMES DAILY
Refills: 0 | Status: DISCONTINUED | COMMUNITY
End: 2023-07-21

## 2023-07-21 NOTE — REASON FOR VISIT
[FreeTextEntry1] : 63   year old male presenting for cardiac revaluation. \par . \par The patient's history is primarily that of:\par 1.	Hypertension.\par 2.	Hyperlipidemia.\par 3.	A dilated ascending thoracic aorta. 4 cm\par 4.             coronary atherosclerosis by increased calcium scoring\par \par Continues to follow up with Dr. Cabral/ Sundeep for sleep apnea. Has been very compliant with his BIPAP using it approx 97% with > 4 hrs. Sleep study abnormal with an AHI of 16.\par \par Aspirin was stopped due to history of stomach ulcers\par \par Has retired and focused on diet and exercise with some weight loss.\par \par Blood work collected will be reviewed\par \par Denies any exertional chest pain, sob, palps or edema. \par \par Calcium scoring CT -  540 which was increased from that of 2018.

## 2023-07-21 NOTE — HISTORY OF PRESENT ILLNESS
[FreeTextEntry1] : calcium scoring done through [facility] examination on 6/23/2022 was increased  compared to prior test done 2018.  \par \par Based on that and his lipid values we had increased his atorvastatin to 20 mg day in 2018.\par \par Subsequently, the patient developed some mild LFT abnormalities and we reduced his atorvastatin to 10 mg a day.\par \par Changed patient over to rosuvastatin 40 mg a day because of a low HDL on atorvastatin with Zetia 10 mg\par \par \par \par \par

## 2023-07-21 NOTE — ASSESSMENT
[FreeTextEntry1] : Sinus bradycardia at 49 bpm.  No significant ST-T wave changes.\par \par Lab data -----------------------------------Simva 40/  [id number] \par -------3/2/21--3/1/22--8/15/22--10/6/22--3/4/23----23\par Chol--126-----140-----101--------129-----145----------111\par LDL---75-------85-------56----------75-----92-----------59\par Tri-----47------77-------71-----------57----65------------60\par Creat-0.92\par AST--35----------------------------------------------------49\par ALT--40\par G0Z------------------------------------9.9\par \par Lab Data \par        20  On atorvastatin 10 mg\par Chol: 107\par HDL:   36\par LDL:   64\par Tr\par Creat: 0.69\par \par Echocardiogram 2023:\par Normal LV size and function EF 60%\par Left atrial enlargement\par Mild mitral tricuspid sufficiency\par Aorta 4.0 cm\par PA systolic 38 mmHg.-Essentially unchanged\par \par Echocardiogram 2021:\par LVEF 50 to 55%\par Mildly dilated ascending aorta 4 cm\par Not significantly changed from prior study\par \par Echocardiogram 20:\par Normal LV size and function ejection fraction 60-65%\par Pulmonary pressures elevated at 40 mmHg\par Left atrial enlargement\par Ascending aorta 4 cm unchanged in comparison to a study from 2017\par Carotid study 19 mild plaquing but no significant stenosis seen.\par \par Pharmacologic nuclear stress 10/20/2022:\par Exercise: Time: 6 minutes 25 seconds 7 METS\par Heart rate 100 bpm (63%-Submax)\par Converted to Lexiscan\par SPECT imaging: Artifact but no significant ischemia.\par \par Exercise stress test 8/10/18:\par 11 minutes of Shivam protocol (12 Mets).\par Heart rate 149\par Blood pressure response normal\par ECG in assisted images negative for ischemia.\par \par Sleep study 18:\par AHI  = 16\par Lowest O2 sat 86%\par \par CT calcium score 2022:\par Total-540\par Left main-54\par LAD-279\par Circumflex-126\par RCA-281\par Ascending aorta 4 cm\par \par EBCT 3/34/18 \par 3 circumflex lesions \par four RCA lesions 3 LAD lesions total calcium score 250 increase from 100, 6 years ago\par \par Impression:\par \par 1. Hypertension: Has been previously well controlled,.  No home readings.\par \par 2.  Concerns about increasing calcium score and possible relationship to increase atherosclerotic burden.\par      No evidence of ischemia on prior stress testing\par \par 3.  Hyperlipidemia.  With rosuvastatin 40 and Zetia 10 has responded very well\par \par 4. 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   echo stable with normal LV function and no change in the dimensions of the ascending aorta. 4.0 cm\par \par 6.  Stable ascending aorta dimensions 4.0 cm.\par \par Recommendations include:\par \par 1.  Monitor blood pressure x 2 weeks and contact me with average.\par \par 2. Rx Rosuvastatin 40 mg with Zetia and repeat labs in 3 months\par      Continue with the improvement in dietary habits, exercise has been encouraged.\par \par 3. Continue Bipap and pulm f/u.\par \par 4. repeat lipid panel in 3 months with goal LDL remaining less than 70 \par \par 6.  Patient encouraged to contact me with regard to any new and/or progressive exertional chest discomfort.\par \par Clinical follow-up in 6 months.\par \par \par

## 2023-10-04 ENCOUNTER — APPOINTMENT (OUTPATIENT)
Dept: PULMONOLOGY | Facility: CLINIC | Age: 63
End: 2023-10-04
Payer: COMMERCIAL

## 2023-10-04 VITALS
DIASTOLIC BLOOD PRESSURE: 66 MMHG | HEIGHT: 66 IN | HEART RATE: 53 BPM | WEIGHT: 161 LBS | OXYGEN SATURATION: 96 % | RESPIRATION RATE: 16 BRPM | BODY MASS INDEX: 25.88 KG/M2 | SYSTOLIC BLOOD PRESSURE: 110 MMHG

## 2023-10-04 DIAGNOSIS — R91.8 OTHER NONSPECIFIC ABNORMAL FINDING OF LUNG FIELD: ICD-10-CM

## 2023-10-04 PROCEDURE — 99214 OFFICE O/P EST MOD 30 MIN: CPT

## 2023-12-22 ENCOUNTER — LABORATORY RESULT (OUTPATIENT)
Age: 63
End: 2023-12-22

## 2024-01-11 ENCOUNTER — APPOINTMENT (OUTPATIENT)
Dept: CARDIOLOGY | Facility: CLINIC | Age: 64
End: 2024-01-11
Payer: COMMERCIAL

## 2024-01-11 VITALS
HEIGHT: 66 IN | HEART RATE: 52 BPM | DIASTOLIC BLOOD PRESSURE: 76 MMHG | WEIGHT: 165 LBS | RESPIRATION RATE: 14 BRPM | SYSTOLIC BLOOD PRESSURE: 148 MMHG | BODY MASS INDEX: 26.52 KG/M2

## 2024-01-11 DIAGNOSIS — I25.10 ATHEROSCLEROTIC HEART DISEASE OF NATIVE CORONARY ARTERY W/OUT ANGINA PECTORIS: ICD-10-CM

## 2024-01-11 PROCEDURE — 99214 OFFICE O/P EST MOD 30 MIN: CPT | Mod: 25

## 2024-01-11 PROCEDURE — 93000 ELECTROCARDIOGRAM COMPLETE: CPT

## 2024-01-11 RX ORDER — AMLODIPINE BESYLATE 5 MG/1
5 TABLET ORAL DAILY
Qty: 90 | Refills: 3 | Status: ACTIVE | COMMUNITY
Start: 1900-01-01 | End: 1900-01-01

## 2024-01-11 RX ORDER — ROSUVASTATIN CALCIUM 40 MG/1
40 TABLET, FILM COATED ORAL DAILY
Qty: 90 | Refills: 3 | Status: ACTIVE | COMMUNITY
Start: 2023-03-20 | End: 1900-01-01

## 2024-01-11 RX ORDER — EZETIMIBE 10 MG/1
10 TABLET ORAL DAILY
Qty: 90 | Refills: 3 | Status: ACTIVE | COMMUNITY
Start: 1900-01-01 | End: 1900-01-01

## 2024-01-11 RX ORDER — KRILL/OM-3/DHA/EPA/PHOSPHO/AST 1000-230MG
81 CAPSULE ORAL
Qty: 90 | Refills: 3 | Status: ACTIVE | COMMUNITY
Start: 2024-01-11

## 2024-01-11 NOTE — ASSESSMENT
[FreeTextEntry1] : EKG: Sinus bradycardia at 52 bpm. No significant ST-T wave changes.  Lab data -----------------------------------Simva 40/ [id number] -------3/2/21--3/1/22--8/15/22--10/6/22--3/4/23--23--23 Chol--126-----140-----101--------129-----145--------111--------124 HDL------------------------------------------------------------------------47 LDL---75-------85-------56----------75------92---------59----------62 Tri-----47-------77-------71----------57------65---------60---------70 Creat-0.92 AST--35----------------------------------------------------49 ALT--40 L0L------------------------------------9.9  Lab Data 20 On atorvastatin 10 mg Chol: 107 HDL: 36 LDL: 64 Tr Creat: 0.69  Echocardiogram 2023: Normal LV size and function EF 60% Left atrial enlargement Mild mitral tricuspid sufficiency Aorta 4.0 cm PA systolic 38 mmHg.-Essentially unchanged  Echocardiogram 2021: LVEF 50 to 55% Mildly dilated ascending aorta 4 cm Not significantly changed from prior study  Echocardiogram 20: Normal LV size and function ejection fraction 60-65% Pulmonary pressures elevated at 40 mmHg Left atrial enlargement Ascending aorta 4 cm unchanged in comparison to a study from 2017 Carotid study 19 mild plaquing but no significant stenosis seen.  Pharmacologic nuclear stress 10/20/2022: Exercise: Time: 6 minutes 25 seconds 7 METS Heart rate 100 bpm (63%-Submax) Converted to Lexiscan SPECT imaging: Artifact but no significant ischemia.  Exercise stress test 8/10/18: 11 minutes of Shivam protocol (12 Mets). Heart rate 149 Blood pressure response normal ECG in assisted images negative for ischemia.  Sleep study 18: AHI = 16 Lowest O2 sat 86%  CT calcium score 2022: Total-540 Left main-54 LAD-279 Circumflex-126 RCA-281 Ascending aorta 4 cm  EBCT 3/34/18 3 circumflex lesions Four RCA lesions 3 LAD lesions total calcium score 250 increase from 100, 6 years ago  Impression: 1. Hypertension: Elevated here today.  Has been previously well controlled,. No home readings.  2. Concerns about increasing calcium score and possible relationship to increase atherosclerotic burden. No evidence of ischemia on prior stress testing.  3. Hyperlipidemia. With rosuvastatin 40 and Zetia 10 has responded very well.  4. SAIMA which is being treated with good compliance and affect and stable pulmonary artery pressure 38 mmHg.  5.  echo stable with normal LV function and no change in the dimensions of the ascending aorta. 4.0 cm.  6. Stable ascending aorta dimensions 4.0 cm.  Recommendations include: 1. Monitor blood pressure x 2 weeks and contact me with average.  2. Rx Rosuvastatin 40 mg with Zetia and repeat labs in 3 months. Continue with the improvement in dietary habits, exercise has been encouraged.  3. Continue Bipap and pulm f/u.  4. repeat lipid panel in 3 months with goal LDL remaining less than 70.  5.  Patient should be on aspirin 81 mg daily.  Unclear how and why he stopped it previously.  He will start this after his cataract surgeries.  6.  There is no cardiac contraindication to proceeding with the cataract surgeries.  There are no special cardiac precautions.   Clinical follow-up in 6 months.

## 2024-01-11 NOTE — REASON FOR VISIT
[FreeTextEntry1] : 63   year old male presenting for cardiac revaluation in anticipation of bilateral cataract surgery.  The right will be done 1/29/2024. .  The patient's history is primarily that of: 1.	Hypertension. 2.	Hyperlipidemia. 3.	A dilated ascending thoracic aorta. 4 cm 4.     coronary atherosclerosis by increased calcium scoring 5.      SAIMA  Continues to follow up with Dr. Cabral/ Sundeep for sleep apnea. Has been very compliant with his BIPAP using it approx 97% with > 4 hrs. Sleep study abnormal with an AHI of 16.  Aspirin was stopped due to history of stomach ulcers  Has retired and focused on diet and exercise with some weight loss.  Denies any exertional chest pain, sob, palps or edema.   Calcium scoring CT -  540 which was increased from that of 2018.

## 2024-01-11 NOTE — HISTORY OF PRESENT ILLNESS
[FreeTextEntry1] : calcium scoring done through [facility] examination on 6/23/2022 was increased  compared to prior test done 2018.    Based on that and his lipid values we had increased his atorvastatin to 20 mg day   Subsequently, the patient developed some mild LFT abnormalities and we reduced his atorvastatin to 10 mg a day.  Then changed to rosuvastatin 40 mg a day because of a low HDL on atorvastatin with Zetia 10 mg  Admits to being much more sedentary with minimal exercise lately.  Feels that he is fatiguing more readily because of this.

## 2024-01-11 NOTE — PHYSICAL EXAM
[FreeTextEntry1] :                    Well appearing and nourished with no obvious deformities or distress.\par  \par  Eyes: \par  No conjunctival injection and no xanthelasmas.\par  HEENT: \par  Normocephalic.Normal oral mucosa. No pallor or cyanosis\par  Neck: \par  No jugular venous distension. with normal A and V wave forms. No palpable adenopathy.\par  Cardiovascular: \par  Normal rate and rhythm with normal S1, S2 and a grade 1/6 systolic murmur. Distal arterial pulses are normal. No significant peripheral edema.\par  Pulmonary: \par  Lungs are clear to auscultation and percussion. Normal respiratory pattern without any accessory muscle use\par  Abdomen: \par  Soft, non-tender ; no palpable organomegaly or masses.\par  Extremities:\par  No digital clubbing, cyanosis or ischemic changes.\par  Skin: \par  No skin lesions, rashes, ulcers or xanthomas.\par  Psychiatric: \par  Alert and oriented to person, place and time. Appropriate mood and affect.

## 2024-02-16 ENCOUNTER — NON-APPOINTMENT (OUTPATIENT)
Age: 64
End: 2024-02-16

## 2024-06-21 LAB
ALBUMIN SERPL ELPH-MCNC: 4.6 G/DL
ALP BLD-CCNC: 59 U/L
ALT SERPL-CCNC: 34 U/L
ANION GAP SERPL CALC-SCNC: 12 MMOL/L
AST SERPL-CCNC: 35 U/L
BILIRUB SERPL-MCNC: 0.8 MG/DL
BUN SERPL-MCNC: 11 MG/DL
CALCIUM SERPL-MCNC: 9.4 MG/DL
CHLORIDE SERPL-SCNC: 103 MMOL/L
CHOLEST SERPL-MCNC: 94 MG/DL
CO2 SERPL-SCNC: 25 MMOL/L
CREAT SERPL-MCNC: 0.87 MG/DL
EGFR: 96 ML/MIN/1.73M2
ESTIMATED AVERAGE GLUCOSE: 117 MG/DL
GLUCOSE SERPL-MCNC: 84 MG/DL
HBA1C MFR BLD HPLC: 5.7 %
HDLC SERPL-MCNC: 33 MG/DL
LDLC SERPL CALC-MCNC: 46 MG/DL
NONHDLC SERPL-MCNC: 61 MG/DL
POTASSIUM SERPL-SCNC: 4.1 MMOL/L
PROT SERPL-MCNC: 6.9 G/DL
SODIUM SERPL-SCNC: 141 MMOL/L
TRIGL SERPL-MCNC: 72 MG/DL

## 2024-06-25 ENCOUNTER — APPOINTMENT (OUTPATIENT)
Dept: CARDIOLOGY | Facility: CLINIC | Age: 64
End: 2024-06-25
Payer: COMMERCIAL

## 2024-06-25 VITALS
DIASTOLIC BLOOD PRESSURE: 70 MMHG | OXYGEN SATURATION: 96 % | WEIGHT: 158 LBS | SYSTOLIC BLOOD PRESSURE: 112 MMHG | BODY MASS INDEX: 25.39 KG/M2 | HEART RATE: 49 BPM | HEIGHT: 66 IN | RESPIRATION RATE: 16 BRPM

## 2024-06-25 DIAGNOSIS — G47.33 OBSTRUCTIVE SLEEP APNEA (ADULT) (PEDIATRIC): ICD-10-CM

## 2024-06-25 DIAGNOSIS — I10 ESSENTIAL (PRIMARY) HYPERTENSION: ICD-10-CM

## 2024-06-25 DIAGNOSIS — E78.5 HYPERLIPIDEMIA, UNSPECIFIED: ICD-10-CM

## 2024-06-25 DIAGNOSIS — I27.21 SECONDARY PULMONARY ARTERIAL HYPERTENSION: ICD-10-CM

## 2024-06-25 DIAGNOSIS — I34.0 NONRHEUMATIC MITRAL (VALVE) INSUFFICIENCY: ICD-10-CM

## 2024-06-25 DIAGNOSIS — I77.810 THORACIC AORTIC ECTASIA: ICD-10-CM

## 2024-06-25 PROCEDURE — 93000 ELECTROCARDIOGRAM COMPLETE: CPT

## 2024-06-25 PROCEDURE — 99214 OFFICE O/P EST MOD 30 MIN: CPT

## 2024-06-25 RX ORDER — NEBIVOLOL 5 MG/1
5 TABLET ORAL DAILY
Qty: 90 | Refills: 3 | Status: ACTIVE | COMMUNITY
Start: 2018-12-31 | End: 1900-01-01

## 2024-10-07 ENCOUNTER — APPOINTMENT (OUTPATIENT)
Dept: PULMONOLOGY | Facility: CLINIC | Age: 64
End: 2024-10-07

## 2025-01-08 ENCOUNTER — NON-APPOINTMENT (OUTPATIENT)
Age: 65
End: 2025-01-08

## 2025-01-08 ENCOUNTER — APPOINTMENT (OUTPATIENT)
Dept: CARDIOLOGY | Facility: CLINIC | Age: 65
End: 2025-01-08
Payer: COMMERCIAL

## 2025-01-08 VITALS
SYSTOLIC BLOOD PRESSURE: 128 MMHG | WEIGHT: 161 LBS | RESPIRATION RATE: 16 BRPM | BODY MASS INDEX: 25.88 KG/M2 | HEIGHT: 66 IN | DIASTOLIC BLOOD PRESSURE: 68 MMHG | HEART RATE: 51 BPM

## 2025-01-08 DIAGNOSIS — R09.89 OTHER SPECIFIED SYMPTOMS AND SIGNS INVOLVING THE CIRCULATORY AND RESPIRATORY SYSTEMS: ICD-10-CM

## 2025-01-08 DIAGNOSIS — G47.33 OBSTRUCTIVE SLEEP APNEA (ADULT) (PEDIATRIC): ICD-10-CM

## 2025-01-08 DIAGNOSIS — I10 ESSENTIAL (PRIMARY) HYPERTENSION: ICD-10-CM

## 2025-01-08 DIAGNOSIS — I25.10 ATHEROSCLEROTIC HEART DISEASE OF NATIVE CORONARY ARTERY W/OUT ANGINA PECTORIS: ICD-10-CM

## 2025-01-08 DIAGNOSIS — I07.1 RHEUMATIC TRICUSPID INSUFFICIENCY: ICD-10-CM

## 2025-01-08 DIAGNOSIS — I77.810 THORACIC AORTIC ECTASIA: ICD-10-CM

## 2025-01-08 DIAGNOSIS — I34.0 NONRHEUMATIC MITRAL (VALVE) INSUFFICIENCY: ICD-10-CM

## 2025-01-08 DIAGNOSIS — I27.21 SECONDARY PULMONARY ARTERIAL HYPERTENSION: ICD-10-CM

## 2025-01-08 DIAGNOSIS — E78.5 HYPERLIPIDEMIA, UNSPECIFIED: ICD-10-CM

## 2025-01-08 PROCEDURE — 99214 OFFICE O/P EST MOD 30 MIN: CPT

## 2025-01-08 PROCEDURE — 93000 ELECTROCARDIOGRAM COMPLETE: CPT

## 2025-05-26 ENCOUNTER — NON-APPOINTMENT (OUTPATIENT)
Age: 65
End: 2025-05-26

## 2025-05-28 ENCOUNTER — APPOINTMENT (OUTPATIENT)
Dept: CARDIOLOGY | Facility: CLINIC | Age: 65
End: 2025-05-28
Payer: MEDICARE

## 2025-05-28 ENCOUNTER — APPOINTMENT (OUTPATIENT)
Dept: CARDIOLOGY | Facility: CLINIC | Age: 65
End: 2025-05-28

## 2025-05-28 PROCEDURE — 93880 EXTRACRANIAL BILAT STUDY: CPT

## 2025-05-28 PROCEDURE — 93306 TTE W/DOPPLER COMPLETE: CPT

## 2025-06-17 ENCOUNTER — NON-APPOINTMENT (OUTPATIENT)
Age: 65
End: 2025-06-17

## 2025-06-18 ENCOUNTER — APPOINTMENT (OUTPATIENT)
Dept: CARDIOLOGY | Facility: CLINIC | Age: 65
End: 2025-06-18
Payer: MEDICARE

## 2025-06-18 VITALS
DIASTOLIC BLOOD PRESSURE: 82 MMHG | WEIGHT: 162 LBS | HEART RATE: 57 BPM | SYSTOLIC BLOOD PRESSURE: 140 MMHG | HEIGHT: 66 IN | BODY MASS INDEX: 26.03 KG/M2 | OXYGEN SATURATION: 98 % | RESPIRATION RATE: 16 BRPM

## 2025-06-18 PROCEDURE — G2211 COMPLEX E/M VISIT ADD ON: CPT

## 2025-06-18 PROCEDURE — 93000 ELECTROCARDIOGRAM COMPLETE: CPT

## 2025-06-18 PROCEDURE — 99204 OFFICE O/P NEW MOD 45 MIN: CPT
